# Patient Record
Sex: MALE | Race: WHITE | Employment: FULL TIME | URBAN - METROPOLITAN AREA
[De-identification: names, ages, dates, MRNs, and addresses within clinical notes are randomized per-mention and may not be internally consistent; named-entity substitution may affect disease eponyms.]

---

## 2020-10-04 ENCOUNTER — APPOINTMENT (OUTPATIENT)
Dept: CT IMAGING | Facility: HOSPITAL | Age: 25
End: 2020-10-04
Attending: EMERGENCY MEDICINE
Payer: COMMERCIAL

## 2020-10-04 PROCEDURE — 70486 CT MAXILLOFACIAL W/O DYE: CPT | Performed by: EMERGENCY MEDICINE

## 2020-10-04 PROCEDURE — 70450 CT HEAD/BRAIN W/O DYE: CPT | Performed by: EMERGENCY MEDICINE

## 2020-10-04 NOTE — ED INITIAL ASSESSMENT (HPI)
Pt presents to ED after being found lying on the ground of the hotel lobby. +ETOH. Pt had a lac to the bridge of his nose. Pt states he face planted on the ground. Pt denies LOC.

## 2020-10-04 NOTE — ED PROVIDER NOTES
Patient Seen in: [facility] AND [facility] Emergency Department    History   Patient presents with:  Alcohol Intoxication    Stated Complaint: Facial trauma/alcohol intoxication    HPI    27-year-old male without past medical history presenting via EMS for evaluat c-spine tenderness/stepoff/deformity. Cardiovascular: RRR. Pulmonary/Chest: Effort normal. CTAB. Abdominal: Soft. Nontender. Musculoskeletal: No gross deformity. Neurological: Alert. Inebriated. CN II-XII grossly intact.  BUE/BLE proximally and dista intracerebral hemorrhage/contusion, facial fracture. Pulse ox: 96%:Normal on RA, as interpreted by myself    Evaluation for BHT in admitted intoxicated though neurologically intact patient without complaints aside from facial pain.  CTs as above, tetanus